# Patient Record
Sex: MALE | Race: WHITE | Employment: FULL TIME | URBAN - METROPOLITAN AREA
[De-identification: names, ages, dates, MRNs, and addresses within clinical notes are randomized per-mention and may not be internally consistent; named-entity substitution may affect disease eponyms.]

---

## 2018-07-24 NOTE — PATIENT DISCUSSION
(E61.753) Keratoconjunct sicca, not specified as Sjogren's, bilateral - Assesment : Examination revealed Dry Eye Syndrome - Plan : Recommend the use of ATs PRN especially when sitting down to read or before extended computer use. Monitor for changes. Advised patient to call our office with decreased vision or increased symptoms.

## 2018-07-24 NOTE — PATIENT DISCUSSION
(H25.013) Cortical age-related cataract, bilateral - Assesment : Examination revealed Cortical Senile Cataract. Mild OU. Patient is currently asymptomatic and functioning well. - Plan : Monitor for changes. Advised patient to call our office with decreased vision or increased symptoms. Glasses prescription updated and  given to help with visual acuity and function.   RTC 1-2 years/EXAM

## 2018-07-30 ENCOUNTER — HOSPITAL ENCOUNTER (OUTPATIENT)
Dept: MRI IMAGING | Age: 43
Discharge: HOME | End: 2018-07-30
Attending: PHYSICAL MEDICINE & REHABILITATION
Payer: COMMERCIAL

## 2018-07-30 DIAGNOSIS — G89.29 CHRONIC PAIN: ICD-10-CM

## 2018-07-30 DIAGNOSIS — M62.838 MUSCLE SPASM: ICD-10-CM

## 2018-07-30 DIAGNOSIS — R51.9 HEADACHE: ICD-10-CM

## 2018-07-30 PROCEDURE — 70551 MRI BRAIN STEM W/O DYE: CPT

## 2021-02-26 ENCOUNTER — IMPORTED ENCOUNTER (OUTPATIENT)
Dept: URBAN - METROPOLITAN AREA CLINIC 1 | Facility: CLINIC | Age: 46
End: 2021-02-26

## 2021-02-26 PROBLEM — H40.1121: Noted: 2021-02-26

## 2021-02-26 PROBLEM — H40.1113: Noted: 2021-02-26

## 2021-02-26 PROCEDURE — 92133 CPTRZD OPH DX IMG PST SGM ON: CPT

## 2021-02-26 PROCEDURE — 92015 DETERMINE REFRACTIVE STATE: CPT

## 2021-02-26 PROCEDURE — 99204 OFFICE O/P NEW MOD 45 MIN: CPT

## 2021-02-26 PROCEDURE — 92020 GONIOSCOPY: CPT

## 2021-02-26 NOTE — PATIENT DISCUSSION
1.  Severe Open Angle Glaucoma OD/ Mild Open Angle Glaucoma OS- (0.90/0.40) IOP elevated today 52/36. 1 gtt Rocklatan and Alphagan instilled in office. IOP after drops 45/31. Begin Rocklatan QHS OU (sample given). Begin Alphagan TID OU (sample given). OCT done today shows Decrease in RNFL Inferior Superior and Temporal OD. Patient advised to be compliant with gtts. Gonioscopy done today showing angles open 360° OU. Condition was discussed with patient and patient understands. Will continue to monitor patient for any progression in condition. Patient was advised to call us with any problems questions or concerns. Patient deferred Manifest Rx today. Return for an appointment in 1 wk 10/VF with Dr. Billie Farley.

## 2021-03-05 ENCOUNTER — IMPORTED ENCOUNTER (OUTPATIENT)
Dept: URBAN - METROPOLITAN AREA CLINIC 1 | Facility: CLINIC | Age: 46
End: 2021-03-05

## 2021-03-05 PROBLEM — H40.1121: Noted: 2021-03-05

## 2021-03-05 PROBLEM — H40.1113: Noted: 2021-03-05

## 2021-03-05 PROCEDURE — 92012 INTRM OPH EXAM EST PATIENT: CPT

## 2021-03-05 PROCEDURE — 92083 EXTENDED VISUAL FIELD XM: CPT

## 2021-05-07 ENCOUNTER — IMPORTED ENCOUNTER (OUTPATIENT)
Dept: URBAN - METROPOLITAN AREA CLINIC 1 | Facility: CLINIC | Age: 46
End: 2021-05-07

## 2021-05-07 PROBLEM — H40.1121: Noted: 2021-05-07

## 2021-05-07 PROBLEM — H40.1113: Noted: 2021-05-07

## 2021-05-07 PROCEDURE — 99213 OFFICE O/P EST LOW 20 MIN: CPT

## 2021-05-07 NOTE — PATIENT DISCUSSION
/ Mild Open Angle Glaucoma OS- (CD 0.90/0.30) IOP elevated OU despite compliance. Begin Cosopt BID OU (erx). Continue Alphagan BID OU and Rocklatan QHS OU. Consider surgical intervention if IOP remains elevated on maximum topical therapy. Patient advised to be compliant with gtts. Condition was discussed with patient and patient understands. Will continue to monitor patient for any progression in condition. Patient was advised to call us with any problems questions or concerns.

## 2021-05-07 NOTE — PATIENT DISCUSSION
1.  Severe Open Angle Glaucoma OD/Mild Open Angle Glaucoma OS (CD 0.90/0.30) - IOP elevated OU despite compliance. Begin Cosopt BID OU (erx). Continue Alphagan BID OU and Rocklatan QHS OU. Consider surgical intervention if IOP remains elevated on maximum topical therapy. Patient advised to be compliant with gtts. Condition was discussed with patient and patient understands. Will continue to monitor patient for any progression in condition. Patient was advised to call us with any problems questions or concerns. 2. Cataract OU - Observe for now without surgical interventionReturn for an appointment in 1 month 10 (IOP check) with Dr. Prabhu Painting.

## 2021-09-22 ENCOUNTER — IMPORTED ENCOUNTER (OUTPATIENT)
Dept: URBAN - METROPOLITAN AREA CLINIC 1 | Facility: CLINIC | Age: 46
End: 2021-09-22

## 2021-09-22 PROBLEM — H10.45: Noted: 2021-09-22

## 2021-09-22 PROCEDURE — 92012 INTRM OPH EXAM EST PATIENT: CPT

## 2021-09-22 NOTE — PATIENT DISCUSSION
Severe Open Angle Glaucoma OD/Mild Open Angle Glaucoma OS (CD 0.90/0.30) - IOP improved OU continue Cosopt BID OU Alphagan BID OU and Rocklatan QHS OU. Consider surgical intervention if IOP remains elevated on maximum topical therapy. Patient advised to be compliant with gtts.   2.  Cataract OU - Observe for now without surgical intervention

## 2021-09-22 NOTE — PATIENT DISCUSSION
1.  Allergic Conjunctivitis OU - May use OTC Pataday QD OU and Recommend ATs BID-TID OU routinely. The condition was  discussed with the patient. Avoidance of allergens and cool compresses were recommended. 2. Severe Open Angle Glaucoma OD/Mild Open Angle Glaucoma OS (CD 0.90/0.30) - IOP improved OU continue Cosopt BID OU Alphagan BID OU and Rocklatan QHS OU. Consider surgical intervention if IOP remains elevated on maximum topical therapy. Patient advised to be compliant with gtts. 3.  Cataract OU - Observe for now without surgical interventionReturn for an appointment in 3 months 10 with Dr. Isaura Vaughn.

## 2022-01-28 NOTE — PATIENT DISCUSSION
1.  Severe Open Angle Glaucoma OD/ Mild Open Angle Glaucoma OS- (CD 0.90/0.30) IOP 18/17 today. Cont Rocklatan QHS OU (erx'd sample given). Cont Alphagan BID OU (erx'd). HVF shows inferior nasal step and early superior nasal step OD WNL OS. Patient advised to be compliant with gtts. Condition was discussed with patient and patient understands. Will continue to monitor patient for any progression in condition. Patient was advised to call us with any problems questions or concerns. Return for an appointment in  2 months 10/IOP check/MRx with Dr. Rocky Wiseman. Pfizer dose 1, 2, and 3

## 2022-04-02 ASSESSMENT — TONOMETRY
OD_IOP_MMHG: 52
OD_IOP_MMHG: 16
OS_IOP_MMHG: 17
OS_IOP_MMHG: 21
OD_IOP_MMHG: 18
OD_IOP_MMHG: 29
OS_IOP_MMHG: 36
OS_IOP_MMHG: 11

## 2022-04-02 ASSESSMENT — VISUAL ACUITY
OD_SC: 20/20-2
OD_SC: 20/20-1
OS_CC: 20/20-1
OS_CC: 20/20
OS_SC: 20/20
OD_SC: 20/25
OS_SC: 20/20
OD_CC: 20/40+2
OD_CC: 20/50+2
OS_SC: 20/20
OD_SC: 20/20-2
OS_SC: 20/20

## 2024-05-07 ENCOUNTER — NEW PATIENT (OUTPATIENT)
Dept: URBAN - METROPOLITAN AREA CLINIC 2 | Facility: CLINIC | Age: 49
End: 2024-05-07

## 2024-05-07 DIAGNOSIS — H10.45: ICD-10-CM

## 2024-05-07 DIAGNOSIS — H40.1121: ICD-10-CM

## 2024-05-07 DIAGNOSIS — H25.13: ICD-10-CM

## 2024-05-07 DIAGNOSIS — H40.1113: ICD-10-CM

## 2024-05-07 PROCEDURE — 92133 CPTRZD OPH DX IMG PST SGM ON: CPT

## 2024-05-07 PROCEDURE — 92004 COMPRE OPH EXAM NEW PT 1/>: CPT

## 2024-05-07 RX ORDER — LATANOPROST 50 UG/ML: 1 SOLUTION/ DROPS OPHTHALMIC EVERY EVENING

## 2024-05-07 RX ORDER — DORZOLAMIDE HYDROCHLORIDE TIMOLOL MALEATE 20; 5 MG/ML; MG/ML: 1 SOLUTION/ DROPS OPHTHALMIC TWICE A DAY

## 2024-05-07 RX ORDER — BRIMONIDINE TARTRATE 2 MG/MG: 1 SOLUTION/ DROPS OPHTHALMIC

## 2024-05-07 ASSESSMENT — VISUAL ACUITY
OS_SC: 20/30
OD_SC: 20/100

## 2024-05-07 ASSESSMENT — TONOMETRY
OS_IOP_MMHG: 40
OS_IOP_MMHG: 38
OD_IOP_MMHG: 43
OD_IOP_MMHG: 38

## 2024-05-10 ENCOUNTER — FOLLOW UP (OUTPATIENT)
Dept: URBAN - METROPOLITAN AREA CLINIC 2 | Facility: CLINIC | Age: 49
End: 2024-05-10

## 2024-05-10 DIAGNOSIS — H40.1113: ICD-10-CM

## 2024-05-10 DIAGNOSIS — H40.1121: ICD-10-CM

## 2024-05-10 PROCEDURE — 92014 COMPRE OPH EXAM EST PT 1/>: CPT

## 2024-05-10 ASSESSMENT — TONOMETRY
OS_IOP_MMHG: 17
OD_IOP_MMHG: 17

## 2024-05-10 ASSESSMENT — VISUAL ACUITY
OD_SC: 20/70
OD_PH: 20/25
OS_SC: 20/30

## 2025-01-08 ENCOUNTER — FOLLOW UP (OUTPATIENT)
Age: 50
End: 2025-01-08

## 2025-01-08 DIAGNOSIS — H40.1113: ICD-10-CM

## 2025-01-08 DIAGNOSIS — H40.1121: ICD-10-CM

## 2025-01-08 PROCEDURE — 92012 INTRM OPH EXAM EST PATIENT: CPT

## 2025-01-08 PROCEDURE — 92083 EXTENDED VISUAL FIELD XM: CPT

## 2025-06-02 ENCOUNTER — EMERGENCY VISIT (OUTPATIENT)
Age: 50
End: 2025-06-02

## 2025-06-02 DIAGNOSIS — H16.203: ICD-10-CM

## 2025-06-02 PROCEDURE — 92012 INTRM OPH EXAM EST PATIENT: CPT
